# Patient Record
Sex: FEMALE | Race: WHITE | Employment: STUDENT | URBAN - NONMETROPOLITAN AREA
[De-identification: names, ages, dates, MRNs, and addresses within clinical notes are randomized per-mention and may not be internally consistent; named-entity substitution may affect disease eponyms.]

---
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
## 2020-11-05 ENCOUNTER — APPOINTMENT (OUTPATIENT)
Dept: GENERAL RADIOLOGY | Age: 20
End: 2020-11-05
Payer: COMMERCIAL

## 2020-11-05 ENCOUNTER — HOSPITAL ENCOUNTER (EMERGENCY)
Age: 20
Discharge: HOME | End: 2020-11-06
Attending: EMERGENCY MEDICINE
Payer: COMMERCIAL

## 2020-11-05 VITALS
TEMPERATURE: 97.4 F | DIASTOLIC BLOOD PRESSURE: 88 MMHG | BODY MASS INDEX: 32.28 KG/M2 | HEART RATE: 102 BPM | RESPIRATION RATE: 20 BRPM | WEIGHT: 200 LBS | OXYGEN SATURATION: 96 % | SYSTOLIC BLOOD PRESSURE: 134 MMHG

## 2020-11-05 PROCEDURE — 71045 X-RAY EXAM CHEST 1 VIEW: CPT

## 2020-11-05 PROCEDURE — 99283 EMERGENCY DEPT VISIT LOW MDM: CPT

## 2020-11-05 RX ORDER — ALBUTEROL SULFATE 90 UG/1
2 AEROSOL, METERED RESPIRATORY (INHALATION) EVERY 6 HOURS PRN
COMMUNITY

## 2020-11-05 RX ORDER — AZITHROMYCIN 250 MG/1
250 TABLET, FILM COATED ORAL DAILY
COMMUNITY
End: 2021-07-19 | Stop reason: ALTCHOICE

## 2020-11-05 ASSESSMENT — ENCOUNTER SYMPTOMS
SHORTNESS OF BREATH: 1
EYE DISCHARGE: 0
ABDOMINAL PAIN: 0
DIARRHEA: 0
SORE THROAT: 0
WHEEZING: 0
COUGH: 1
EYE PAIN: 0
BLOOD IN STOOL: 0

## 2020-11-06 PROCEDURE — 6370000000 HC RX 637 (ALT 250 FOR IP): Performed by: EMERGENCY MEDICINE

## 2020-11-06 RX ORDER — LEVOFLOXACIN 500 MG/1
500 TABLET, FILM COATED ORAL DAILY
Qty: 5 TABLET | Refills: 0 | Status: SHIPPED | OUTPATIENT
Start: 2020-11-06 | End: 2020-11-11

## 2020-11-06 RX ORDER — LEVOFLOXACIN 500 MG/1
500 TABLET, FILM COATED ORAL ONCE
Status: COMPLETED | OUTPATIENT
Start: 2020-11-06 | End: 2020-11-06

## 2020-11-06 RX ADMIN — LEVOFLOXACIN 500 MG: 500 TABLET, FILM COATED ORAL at 00:06

## 2020-11-06 NOTE — ED TRIAGE NOTES
Pt diagnosed with COVID. Complains of cough, shortness of breath and chest heaviness that has persisted.

## 2020-11-06 NOTE — ED NOTES
Pt given discharge instructions and prescription. Verbalized understanding and use of med. Pt left ambulatory per self. Pt in stable condition.       Kristi Gibbs RN  11/06/20 5847

## 2020-11-07 ENCOUNTER — CARE COORDINATION (OUTPATIENT)
Dept: CARE COORDINATION | Age: 20
End: 2020-11-07

## 2020-11-07 NOTE — CARE COORDINATION
Attempted to reach patient for covid-19 f/u s/p recent [facility] ED visit for c/o cough and SOB. COVID-19 testing was completed prior to visit and patient is noted to be positive 12 days prior. Patient was not available at the time of my call and generic voicemail message was left asking patient to please return call to my direct number. Will continue to work to f/u with patient in the future.

## 2020-11-09 ENCOUNTER — CARE COORDINATION (OUTPATIENT)
Dept: CARE COORDINATION | Age: 20
End: 2020-11-09

## 2021-09-17 ENCOUNTER — HOSPITAL ENCOUNTER (EMERGENCY)
Age: 21
Discharge: HOME | End: 2021-09-17
Payer: COMMERCIAL

## 2021-09-17 VITALS
RESPIRATION RATE: 20 BRPM | HEIGHT: 65 IN | HEART RATE: 107 BPM | SYSTOLIC BLOOD PRESSURE: 133 MMHG | TEMPERATURE: 97.5 F | WEIGHT: 200 LBS | OXYGEN SATURATION: 98 % | BODY MASS INDEX: 33.32 KG/M2 | DIASTOLIC BLOOD PRESSURE: 90 MMHG

## 2021-09-17 DIAGNOSIS — J45.901 ASTHMA WITH ACUTE EXACERBATION, UNSPECIFIED ASTHMA SEVERITY, UNSPECIFIED WHETHER PERSISTENT: ICD-10-CM

## 2021-09-17 DIAGNOSIS — J01.90 ACUTE RHINOSINUSITIS: Primary | ICD-10-CM

## 2021-09-17 PROCEDURE — 99213 OFFICE O/P EST LOW 20 MIN: CPT

## 2021-09-17 PROCEDURE — 99213 OFFICE O/P EST LOW 20 MIN: CPT | Performed by: NURSE PRACTITIONER

## 2021-09-17 RX ORDER — PREDNISONE 20 MG/1
40 TABLET ORAL DAILY
Qty: 10 TABLET | Refills: 0 | Status: SHIPPED | OUTPATIENT
Start: 2021-09-17 | End: 2021-09-22

## 2021-09-17 RX ORDER — AZITHROMYCIN 250 MG/1
TABLET, FILM COATED ORAL
Qty: 6 TABLET | Refills: 0 | Status: SHIPPED | OUTPATIENT
Start: 2021-09-17 | End: 2021-12-04

## 2021-09-17 ASSESSMENT — ENCOUNTER SYMPTOMS
SHORTNESS OF BREATH: 0
NAUSEA: 0
VOMITING: 0
SINUS PRESSURE: 1
COUGH: 1
WHEEZING: 0
DIARRHEA: 0
SORE THROAT: 0

## 2021-09-17 NOTE — ED TRIAGE NOTES
Pt to urgent care due to a cough and sinus congestion.  Pt gets tested at work and yesterday she was negative for COVID

## 2021-09-17 NOTE — ED NOTES
Discharge instructions and prescriptions reviewed with pt. Pt verbalized understanding. Pt ambulated out in stable condition. Assessment unchanged upon discharge.      Bautista Silveira RN  09/17/21 5937

## 2021-09-17 NOTE — ED PROVIDER NOTES
[address]  Urgent Care Encounter       CHIEF COMPLAINT       Chief Complaint   Patient presents with    Sinusitis    Cough       Nurses Notes reviewed and I agree except as noted in the HPI. HISTORY OF PRESENT ILLNESS   Juan Nichols is a 24 y.o. female who presents with complaints of cough, nasal and sinus congestion and tightness in the chest.  Symptoms have been present for the last 3 days. No reports of fever, chills, body aches nausea, vomiting or diarrhea. She was tested at her skilled nursing facility yesterday for Covid and was negative. Patient does report a history of asthma as well and place she is having mild flare. The history is provided by the patient. REVIEW OF SYSTEMS     Review of Systems   Constitutional: Negative for chills and fever. HENT: Positive for congestion, postnasal drip and sinus pressure. Negative for ear pain and sore throat (Resolved). Respiratory: Positive for cough. Negative for shortness of breath and wheezing. Cardiovascular: Negative for chest pain. Gastrointestinal: Negative for diarrhea, nausea and vomiting. Musculoskeletal: Negative for myalgias. Skin: Negative for rash. Neurological: Positive for headaches. PAST MEDICAL HISTORY         Diagnosis Date    ADHD (attention deficit hyperactivity disorder)     Asthma     Gallstone        SURGICALHISTORY     Patient  has a past surgical history that includes Tonsillectomy; Anterior cruciate ligament repair; Upper gastrointestinal endoscopy (N/A, 10/15/2018); Colonoscopy (N/A, 10/15/2018); [place] tooth extraction; pr office/outpt visit,procedure only (Left, 11/28/2018); and Cholecystectomy, laparoscopic (N/A, 1/3/2019).     CURRENT MEDICATIONS       Previous Medications    ALBUTEROL SULFATE HFA (VENTOLIN HFA) 108 (90 BASE) MCG/ACT INHALER    Inhale 2 puffs into the lungs every 6 hours as needed for Wheezing    AMPHETAMINE-DEXTROAMPHETAMINE (ADDERALL XR) 20 MG EXTENDED RELEASE CAPSULE    Take 1 capsule by mouth daily for 30 days. BUDESONIDE (PULMICORT FLEXHALER) 180 MCG/ACT AEPB INHALER    Inhale 2 puffs into the lungs 2 times daily    EPINEPHRINE (EPIPEN) 0.3 MG/0.3ML SOAJ INJECTION    Use as directed for allergic reaction       ALLERGIES     Patient is is allergic to bee venom and adhesive tape. Patients   There is no immunization history on file for this patient. FAMILY HISTORY     Patient's family history includes Cancer in her maternal grandmother; Heart Disease in her mother. SOCIAL HISTORY     Patient  reports that she has never smoked. She has never used smokeless tobacco. She reports that she does not drink alcohol and does not use drugs. PHYSICAL EXAM     ED TRIAGE VITALS  BP: (!) 133/90, Temp: 97.5 °F (36.4 °C), Pulse: 107, Resp: 20, SpO2: 98 %,Estimated body mass index is 33.28 kg/m² as calculated from the following:    Height as of this encounter: 5' 5\" (1.651 m). Weight as of this encounter: 200 lb (90.7 kg). ,No LMP recorded. Physical Exam  Vitals and nursing note reviewed. Constitutional:       General: She is not in acute distress. Appearance: She is well-developed. She is not ill-appearing. HENT:      Head: Normocephalic and atraumatic. Right Ear: Tympanic membrane and ear canal normal.      Left Ear: Tympanic membrane and ear canal normal.      Nose:      Right Sinus: Maxillary sinus tenderness (Mild) present. No frontal sinus tenderness. Left Sinus: Maxillary sinus tenderness (Mild) present. No frontal sinus tenderness. Mouth/Throat:      Lips: Pink. Mouth: Mucous membranes are moist.      Pharynx: Oropharynx is clear. No oropharyngeal exudate or posterior oropharyngeal erythema. Eyes:      General: Lids are normal. No scleral icterus. Conjunctiva/sclera:      Right eye: Right conjunctiva is not injected. Left eye: Left conjunctiva is not injected.       Pupils: Pupils are equal. Cardiovascular:      Rate and Rhythm: Normal rate and regular rhythm. Heart sounds: Normal heart sounds, S1 normal and S2 normal.   Pulmonary:      Effort: Pulmonary effort is normal. No respiratory distress. Breath sounds: Normal breath sounds. Musculoskeletal:      Comments: Normal active ROM x 4 extremities  Gait steady   Lymphadenopathy:      Comments: No head or neck adenopathy   Skin:     General: Skin is warm and dry. Findings: No rash (to exposed areas of skin). Nails: There is no clubbing. Neurological:      General: No focal deficit present. Mental Status: She is alert and oriented to person, place, and time. Sensory: No sensory deficit. Comments: Answers questions readily and appropriately   Psychiatric:         Mood and Affect: Mood normal.         Speech: Speech normal.         Behavior: Behavior normal. Behavior is cooperative. DIAGNOSTIC RESULTS     Labs:No results found for this visit on 09/17/21. IMAGING:    No orders to display         EKG:      URGENT CARE COURSE:     Vitals:    09/17/21 1604   BP: (!) 133/90   Pulse: 107   Resp: 20   Temp: 97.5 °F (36.4 °C)   TempSrc: Temporal   SpO2: 98%   Weight: 200 lb (90.7 kg)   Height: 5' 5\" (1.651 m)       Medications - No data to display         PROCEDURES:  None    FINAL IMPRESSION      1. Acute rhinosinusitis    2. Asthma with acute exacerbation, unspecified asthma severity, unspecified whether persistent          DISPOSITION/ PLAN     Patient presents with acute rhinosinusitis as well as a mild asthma exacerbation. Patient is not wheezing at this time. She does have albuterol inhalers at home that she can use as needed. Patient be treated with azithromycin and prednisone. Follow-up family doctor next week if not improving or ER for worsening condition. Further instructions were outlined verbally and in the patient's discharge instructions. All the patient's questions were answered.  The patient/parent agreed with the plan and was discharged from the  center in good condition. PATIENT REFERRED TO:  MD GRETA [address] [place] [id number] / [facility] [place] [id number]      DISCHARGE MEDICATIONS:  New Prescriptions    AZITHROMYCIN (ZITHROMAX) 250 MG TABLET    Take 2 tablets today, then take one tablet on days 2-5.     PREDNISONE (DELTASONE) 20 MG TABLET    Take 2 tablets by mouth daily for 5 days       Discontinued Medications    No medications on file       Current Discharge Medication List          Shahnaz Escalera, VICENTA - KISHA    (Please note that portions of this note were completed with a voice recognition program. Efforts were made to edit the dictations but occasionally words are mis-transcribed.)         VICENTA Espinal - KISHA  09/17/21 2127

## 2021-12-04 ENCOUNTER — HOSPITAL ENCOUNTER (EMERGENCY)
Age: 21
Discharge: HOME | End: 2021-12-04
Attending: FAMILY MEDICINE
Payer: COMMERCIAL

## 2021-12-04 VITALS
WEIGHT: 210 LBS | SYSTOLIC BLOOD PRESSURE: 128 MMHG | HEIGHT: 66 IN | HEART RATE: 118 BPM | BODY MASS INDEX: 33.75 KG/M2 | DIASTOLIC BLOOD PRESSURE: 86 MMHG | RESPIRATION RATE: 18 BRPM | TEMPERATURE: 97.6 F | OXYGEN SATURATION: 97 %

## 2021-12-04 DIAGNOSIS — J04.0 LARYNGITIS, ACUTE: Primary | ICD-10-CM

## 2021-12-04 PROCEDURE — 99283 EMERGENCY DEPT VISIT LOW MDM: CPT

## 2021-12-04 ASSESSMENT — ENCOUNTER SYMPTOMS
SORE THROAT: 1
TROUBLE SWALLOWING: 0
EYE REDNESS: 0
COUGH: 0
EYE DISCHARGE: 0
VOMITING: 0
NAUSEA: 0
SHORTNESS OF BREATH: 0

## 2021-12-05 NOTE — ED PROVIDER NOTES
[facility]  eMERGENCY dEPARTMENT eNCOUnter          CHIEF COMPLAINT       Chief Complaint   Patient presents with    Pharyngitis    Hoarse       Nurses Notes reviewed and I agree except as noted in the HPI. HISTORY OF PRESENT ILLNESS    Deja Biggs is a 24 y.o. female who presents with sore throat. Patient notes voice is \"hoarse\". Denies fever. Notes previously tested for Covid and rapid strep and both negative. REVIEW OF SYSTEMS     Review of Systems   Constitutional: Negative for chills and fever. HENT: Positive for ear pain and sore throat. Negative for trouble swallowing. Eyes: Negative for discharge and redness. Respiratory: Negative for cough and shortness of breath. Gastrointestinal: Negative for nausea and vomiting. All other systems reviewed and are negative. PAST MEDICAL HISTORY    has a past medical history of ADHD (attention deficit hyperactivity disorder), Asthma, and Gallstone. SURGICAL HISTORY      has a past surgical history that includes Tonsillectomy; Anterior cruciate ligament repair; Upper gastrointestinal endoscopy (N/A, 10/15/2018); Colonoscopy (N/A, 10/15/2018); [place] tooth extraction; pr office/outpt visit,procedure only (Left, 11/28/2018); and Cholecystectomy, laparoscopic (N/A, 1/3/2019). CURRENT MEDICATIONS       Discharge Medication List as of 12/4/2021 10:47 PM      CONTINUE these medications which have NOT CHANGED    Details   amphetamine-dextroamphetamine (ADDERALL XR) 20 MG extended release capsule Take 1 capsule by mouth every morning for 30 days. , Disp-30 capsule, R-0Normal      albuterol sulfate HFA (VENTOLIN HFA) 108 (90 Base) MCG/ACT inhaler Inhale 2 puffs into the lungs every 6 hours as needed for WheezingHistorical Med      EPINEPHrine (EPIPEN) 0.3 MG/0.3ML SOAJ injection Use as directed for allergic reaction, Disp-1 each, R-0Print      albuterol (PROVENTIL) (5 MG/ML) 0.5% nebulizer solution Take 0.5 mLs by nebulization 4 times daily as needed for Wheezing or Shortness of Breath, Disp-120 each, R-3Normal      budesonide (PULMICORT FLEXHALER) 180 MCG/ACT AEPB inhaler Inhale 2 puffs into the lungs 2 times daily, Disp-1 each, R-5Normal             ALLERGIES     is allergic to bee venom and adhesive tape. FAMILY HISTORY     She indicated that her mother is alive. She indicated that her father is alive. She indicated that the status of her maternal grandmother is unknown.   family history includes Cancer in her maternal grandmother; Heart Disease in her mother. SOCIAL HISTORY      reports that she has never smoked. She has never used smokeless tobacco. She reports that she does not drink alcohol and does not use drugs. PHYSICAL EXAM     INITIAL VITALS:  height is 5' 6\" (1.676 m) and weight is 210 lb (95.3 kg). Her temperature is 97.6 °F (36.4 °C). Her blood pressure is 128/86 and her pulse is 118. Her respiration is 18 and oxygen saturation is 97%. Physical Exam  Vitals and nursing note reviewed. Constitutional:       General: She is not in acute distress. Appearance: She is not ill-appearing. HENT:      Right Ear: Tympanic membrane normal.      Left Ear: Tympanic membrane normal.      Nose: No congestion or rhinorrhea. Mouth/Throat:      Mouth: Mucous membranes are dry. Pharynx: Oropharynx is clear. No oropharyngeal exudate or posterior oropharyngeal erythema. Tonsils: No tonsillar exudate or tonsillar abscesses. Eyes:      Conjunctiva/sclera: Conjunctivae normal.      Pupils: Pupils are equal, round, and reactive to light. Pulmonary:      Effort: Pulmonary effort is normal.      Breath sounds: Normal breath sounds. Musculoskeletal:      Cervical back: Normal range of motion and neck supple. Lymphadenopathy:      Cervical: No cervical adenopathy. Skin:     General: Skin is dry. Findings: No rash. Neurological:      Mental Status: She is alert.          DIFFERENTIAL DIAGNOSIS:   URI,laryngitis,    DIAGNOSTIC RESULTS     EKG: All EKG's are interpreted by the Emergency Department Physician who either signs or Co-signs this chart in the absence of a cardiologist.      RADIOLOGY: non-plain film images(s) such as CT, Ultrasound and MRI are read by the radiologist.      LABS:   Labs Reviewed - No data to display    EMERGENCY DEPARTMENT COURSE:   Vitals:    Vitals:    12/04/21 2200   BP: 128/86   Pulse: 118   Resp: 18   Temp: 97.6 °F (36.4 °C)   SpO2: 97%   Weight: 210 lb (95.3 kg)   Height: 5' 6\" (1.676 m)     On exam the patient does not have any focal findings she does have some laryngitis. But the posterior pharynx is otherwise clear. She states that she had a rapid strep that was negative. Her right TM is otherwise unremarkable. No cervical adenopathy. Lungs are otherwise clear this does look like a URI with some post laryngitis. I recommended some loss injures voice rest.  If symptoms persist more than a week then further follow-up with PCP may be warranted. PROCEDURES:  None    FINAL IMPRESSION      1. Laryngitis, acute          DISPOSITION/PLAN   Home. Care instructions provided. Follow up with PCP or ED as needed.      PATIENT REFERRED TO:  Bouchra Kaiser MD  [address] [place] [address]  [phone]    Call in 2 days  If symptoms worsen, As needed      DISCHARGE MEDICATIONS:  Discharge Medication List as of 12/4/2021 10:47 PM      START taking these medications    Details   Benzocaine-Menthol (CEPACOL EXTRA STRENGTH) 15-2.6 MG LOZG lozenge Take 1 lozenge by mouth 3 times daily as needed for Sore Throat, Disp-18 lozenge, R-0Normal             (Please note that portions of this note were completed with a voice recognition program.  Efforts were made to edit the dictations but occasionally words are mis-transcribed.)    MD Lani Barrientos MD  12/04/21 1524

## 2021-12-05 NOTE — ED NOTES
Discharge teaching and instructions for condition explained to patient. AVS reviewed. Went over prescriptions with patient. Patient voiced understanding regarding prescriptions, follow up appointments and care of self at home. Pt discharged to home in stable condition per self.        Michael Ibarra RN  12/04/21 6388

## 2021-12-05 NOTE — ED NOTES
Presents with brother. C/o sore hoarsness around thanksgiving after a lot of \"yelling and screaming\" at family fun. Pt then developed a sore throat on Tuesday with no other complaints. Swabbed Saturday by work at [facility] negative for covid and strep on Tuesday by PCP which was negative also. Pt states antibiotics offered but refused at that time by patient. Brother at bedside.    Job Prajapati RN  12/04/21 2207       Job Prajapati RN  12/04/21 2214

## 2022-01-07 PROBLEM — T63.441A ALLERGIC REACTION TO BEE STING: Status: ACTIVE | Noted: 2022-01-07

## 2022-01-07 PROBLEM — F98.8 ATTENTION DEFICIT DISORDER (ADD) WITHOUT HYPERACTIVITY: Status: ACTIVE | Noted: 2022-01-07

## 2022-01-19 PROBLEM — J45.909 ASTHMA: Status: ACTIVE | Noted: 2022-01-19

## 2022-03-28 ENCOUNTER — HOSPITAL ENCOUNTER (EMERGENCY)
Age: 22
Discharge: HOME | End: 2022-03-28
Payer: COMMERCIAL

## 2022-03-28 VITALS
SYSTOLIC BLOOD PRESSURE: 134 MMHG | BODY MASS INDEX: 34.99 KG/M2 | HEIGHT: 65 IN | TEMPERATURE: 97.8 F | DIASTOLIC BLOOD PRESSURE: 64 MMHG | HEART RATE: 105 BPM | OXYGEN SATURATION: 99 % | WEIGHT: 210 LBS | RESPIRATION RATE: 18 BRPM

## 2022-03-28 DIAGNOSIS — M54.32 SCIATICA OF LEFT SIDE: Primary | ICD-10-CM

## 2022-03-28 PROCEDURE — 99213 OFFICE O/P EST LOW 20 MIN: CPT

## 2022-03-28 PROCEDURE — 99213 OFFICE O/P EST LOW 20 MIN: CPT | Performed by: NURSE PRACTITIONER

## 2022-03-28 RX ORDER — CYCLOBENZAPRINE HCL 5 MG
5 TABLET ORAL 3 TIMES DAILY PRN
Qty: 12 TABLET | Refills: 0 | Status: SHIPPED | OUTPATIENT
Start: 2022-03-28 | End: 2022-04-01

## 2022-03-28 RX ORDER — IBUPROFEN 200 MG
800 TABLET ORAL EVERY 8 HOURS PRN
Qty: 120 TABLET | Refills: 3 | COMMUNITY
Start: 2022-03-28

## 2022-03-28 RX ORDER — PREDNISONE 20 MG/1
TABLET ORAL
Qty: 9 TABLET | Refills: 0 | Status: SHIPPED | OUTPATIENT
Start: 2022-03-28 | End: 2022-04-03

## 2022-03-28 ASSESSMENT — PAIN DESCRIPTION - FREQUENCY: FREQUENCY: CONTINUOUS

## 2022-03-28 ASSESSMENT — ENCOUNTER SYMPTOMS
NAUSEA: 0
BACK PAIN: 1

## 2022-03-28 ASSESSMENT — PAIN DESCRIPTION - ONSET: ONSET: GRADUAL

## 2022-03-28 ASSESSMENT — PAIN DESCRIPTION - PROGRESSION: CLINICAL_PROGRESSION: GRADUALLY WORSENING

## 2022-03-28 ASSESSMENT — PAIN DESCRIPTION - ORIENTATION: ORIENTATION: LEFT;LOWER

## 2022-03-28 ASSESSMENT — PAIN - FUNCTIONAL ASSESSMENT
PAIN_FUNCTIONAL_ASSESSMENT: PREVENTS OR INTERFERES SOME ACTIVE ACTIVITIES AND ADLS
PAIN_FUNCTIONAL_ASSESSMENT: 0-10

## 2022-03-28 ASSESSMENT — PAIN DESCRIPTION - DESCRIPTORS: DESCRIPTORS: DULL

## 2022-03-28 ASSESSMENT — PAIN SCALES - GENERAL: PAINLEVEL_OUTOF10: 4

## 2022-03-28 ASSESSMENT — PAIN DESCRIPTION - LOCATION: LOCATION: BACK;LEG

## 2022-03-28 ASSESSMENT — PAIN DESCRIPTION - PAIN TYPE: TYPE: ACUTE PAIN

## 2022-03-28 NOTE — ED PROVIDER NOTES
[address]  Urgent Care Encounter       CHIEF COMPLAINT       Chief Complaint   Patient presents with    Back Pain     left lower back radiating down left leg       Nurses Notes reviewed and I agree except as noted in the HPI. HISTORY OF PRESENT ILLNESS   Clara Archer is a 24 y.o. female who presents with complaints of left sided lower back pain that is radiating into her left buttock. This is a new problem that started a week ago. She had started exercising and may have strained her back. Denies known injury or trauma. C/O some intermittent tingling down into her toes. Has taken ibuprofen intermittently without improvement. The history is provided by the patient. REVIEW OF SYSTEMS     Review of Systems   Constitutional: Negative for activity change. Cardiovascular: Negative for leg swelling. Gastrointestinal: Negative for nausea. Musculoskeletal: Positive for back pain and myalgias. Negative for joint swelling. Neurological: Negative for weakness. PAST MEDICAL HISTORY         Diagnosis Date    ADHD (attention deficit hyperactivity disorder)     Asthma     Gallstone        SURGICALHISTORY     Patient  has a past surgical history that includes Tonsillectomy; Anterior cruciate ligament repair; Upper gastrointestinal endoscopy (N/A, 10/15/2018); Colonoscopy (N/A, 10/15/2018); [place] tooth extraction; pr office/outpt visit,procedure only (Left, 11/28/2018); and Cholecystectomy, laparoscopic (N/A, 1/3/2019).     CURRENT MEDICATIONS       Previous Medications    ALBUTEROL (PROVENTIL) (5 MG/ML) 0.5% NEBULIZER SOLUTION    Take 0.5 mLs by nebulization 4 times daily as needed for Wheezing or Shortness of Breath    ALBUTEROL SULFATE HFA (VENTOLIN HFA) 108 (90 BASE) MCG/ACT INHALER    Inhale 2 puffs into the lungs every 6 hours as needed for Wheezing    AMPHETAMINE-DEXTROAMPHETAMINE (ADDERALL XR) 20 MG EXTENDED RELEASE CAPSULE    Take 1 capsule by mouth every morning for 30 days. BUDESONIDE (PULMICORT FLEXHALER) 180 MCG/ACT AEPB INHALER    Inhale 2 puffs into the lungs 2 times daily    EPINEPHRINE (EPIPEN) 0.3 MG/0.3ML SOAJ INJECTION    Use as directed for allergic reaction       ALLERGIES     Patient is is allergic to bee venom and adhesive tape. Patients   Immunization History   Administered Date(s) Administered    COVID-19, Moderna, Primary or Immunocompromised, PF, 100mcg/0.5mL 11/15/2021, 01/03/2022    DTaP vaccine 2000, 01/29/2001, 04/05/2001, 05/06/2002, 12/30/2005    HPV 9-valent Phan Jian) 10/19/2016, 03/03/2017, 08/16/2017    Hepatitis A Ped/Adol (Havrix, Vaqta) 10/19/2016, 08/16/2017    Hepatitis B Ped/Adol (Engerix-B, Recombivax HB) 2000, 2000, 04/05/2001    Hib vaccine 2000, 01/29/2001, 04/05/2001, 05/06/2002    Influenza, MDCK Quadv, IM, PF (Flucelvax 2 yrs and older) 11/14/2021    MMR 09/13/2001, 12/30/2005    Meningococcal B, Recombinant Rob Murcia) 10/19/2016, 03/03/2017, 08/16/2017    Meningococcal MCV4P (Menactra) 08/28/2013, 07/13/2018    Pneumococcal Conjugate 7-valent (Tanya Screws) 2000, 01/29/2001, 04/05/2001, 09/13/2001    Polio Virus Vaccine 2000, 01/29/2001, 09/05/2001, 12/30/2005    Tdap (Boostrix, Adacel) 08/28/2013, 12/07/2015    Varicella (Varivax) 09/13/2001, 08/28/2013       FAMILY HISTORY     Patient's family history includes Cancer in her maternal grandmother; Heart Disease in her mother. SOCIAL HISTORY     Patient  reports that she has never smoked. She has never used smokeless tobacco. She reports that she does not drink alcohol and does not use drugs. PHYSICAL EXAM     ED TRIAGE VITALS  BP: 134/64, Temp: 97.8 °F (36.6 °C), Pulse: 105, Resp: 18, SpO2: 99 %,Estimated body mass index is 34.95 kg/m² as calculated from the following:    Height as of this encounter: 5' 5\" (1.651 m). Weight as of this encounter: 210 lb (95.3 kg). ,Patient's last menstrual period was 03/04/2022 daily for 3 days.        Discontinued Medications    BENZOCAINE-MENTHOL (CEPACOL EXTRA STRENGTH) 15-2.6 MG LOZG LOZENGE    Take 1 lozenge by mouth 3 times daily as needed for Sore Throat       Current Discharge Medication List          VICENTA Reddy - CNP    (Please note that portions of this note were completed with a voice recognition program. Efforts were made to edit the dictations but occasionally words are mis-transcribed.)            VICENTA Reddy CNP  03/28/22 1400

## 2022-03-28 NOTE — ED NOTES
PT GIVEN DISCHARGE INSTRUCTIONS, VERBALIZES UNDERSTANDING. PT ASSESSMENT UNCHANGED, DISCHARGED IN STABLE CONDITION.         Angela Martinez RN  03/28/22 2986

## 2022-12-12 ENCOUNTER — NURSE ONLY (OUTPATIENT)
Dept: LAB | Age: 22
End: 2022-12-12

## 2022-12-19 LAB — CYTOLOGY THIN PREP PAP: NORMAL

## 2023-12-13 ENCOUNTER — NURSE ONLY (OUTPATIENT)
Dept: LAB | Age: 23
End: 2023-12-13

## 2023-12-16 LAB
C TRACH RRNA SPEC QL NAA+PROBE: NEGATIVE
N GONORRHOEA RRNA SPEC QL NAA+PROBE: NEGATIVE
SPEC CONTAINER SPEC: NORMAL
SPECIMEN SOURCE: NORMAL
SPECIMEN SOURCE: NORMAL
T VAGINALIS RRNA SPEC QL NAA+PROBE: NEGATIVE

## 2024-03-07 ENCOUNTER — HOSPITAL ENCOUNTER (OUTPATIENT)
Dept: CT IMAGING | Age: 24
Discharge: HOME | End: 2024-03-07
Attending: FAMILY MEDICINE
Payer: COMMERCIAL

## 2024-03-07 DIAGNOSIS — J32.9 RECURRENT SINUSITIS: ICD-10-CM

## 2024-03-07 PROCEDURE — 70486 CT MAXILLOFACIAL W/O DYE: CPT

## 2025-02-26 ENCOUNTER — LAB (OUTPATIENT)
Dept: LAB | Age: 25
End: 2025-02-26

## 2025-02-26 DIAGNOSIS — R53.83 OTHER FATIGUE: ICD-10-CM

## 2025-02-26 LAB
BASOPHILS ABSOLUTE: 0 THOU/MM3 (ref 0–0.1)
BASOPHILS NFR BLD AUTO: 0.2 %
DEPRECATED RDW RBC AUTO: 38.7 FL (ref 35–45)
EOSINOPHIL NFR BLD AUTO: 1.1 %
EOSINOPHILS ABSOLUTE: 0.1 THOU/MM3 (ref 0–0.4)
ERYTHROCYTE [DISTWIDTH] IN BLOOD BY AUTOMATED COUNT: 13.8 % (ref 11.5–14.5)
HCG INTACT+B SERPL-ACNC: 2 MIU/ML (ref 0–5)
HCT VFR BLD AUTO: 40.7 % (ref 37–47)
HGB BLD-MCNC: 12.8 GM/DL (ref 12–16)
IMM GRANULOCYTES # BLD AUTO: 0.04 THOU/MM3 (ref 0–0.07)
IMM GRANULOCYTES NFR BLD AUTO: 0.4 %
LYMPHOCYTES ABSOLUTE: 1.8 THOU/MM3 (ref 1–4.8)
LYMPHOCYTES NFR BLD AUTO: 19.2 %
MCH RBC QN AUTO: 24.5 PG (ref 26–33)
MCHC RBC AUTO-ENTMCNC: 31.4 GM/DL (ref 32.2–35.5)
MCV RBC AUTO: 77.8 FL (ref 81–99)
MONOCYTES ABSOLUTE: 0.4 THOU/MM3 (ref 0.4–1.3)
MONOCYTES NFR BLD AUTO: 3.9 %
NEUTROPHILS ABSOLUTE: 7.1 THOU/MM3 (ref 1.8–7.7)
NEUTROPHILS NFR BLD AUTO: 75.2 %
NRBC BLD AUTO-RTO: 0 /100 WBC
PLATELET # BLD AUTO: 393 THOU/MM3 (ref 130–400)
PMV BLD AUTO: 10.3 FL (ref 9.4–12.4)
RBC # BLD AUTO: 5.23 MILL/MM3 (ref 4.2–5.4)
TSH SERPL DL<=0.05 MIU/L-ACNC: 0.92 UIU/ML (ref 0.27–4.2)
WBC # BLD AUTO: 9.5 THOU/MM3 (ref 4.8–10.8)

## 2025-02-28 ENCOUNTER — LAB (OUTPATIENT)
Dept: LAB | Age: 25
End: 2025-02-28

## 2025-02-28 LAB — HCG INTACT+B SERPL-ACNC: < 1 MIU/ML (ref 0–5)

## 2025-03-11 ENCOUNTER — LAB (OUTPATIENT)
Dept: LAB | Age: 25
End: 2025-03-11

## 2025-03-11 ENCOUNTER — HOSPITAL ENCOUNTER (OUTPATIENT)
Dept: ULTRASOUND IMAGING | Age: 25
Discharge: HOME | End: 2025-03-11
Attending: FAMILY MEDICINE
Payer: COMMERCIAL

## 2025-03-11 ENCOUNTER — RESULTS FOLLOW-UP (OUTPATIENT)
Dept: ULTRASOUND IMAGING | Age: 25
End: 2025-03-11

## 2025-03-11 DIAGNOSIS — R79.89 ELEVATED LIVER FUNCTION TESTS: ICD-10-CM

## 2025-03-11 DIAGNOSIS — Z13.6 SCREENING FOR ISCHEMIC HEART DISEASE: ICD-10-CM

## 2025-03-11 DIAGNOSIS — R79.89 ELEVATED LIVER FUNCTION TESTS: Primary | ICD-10-CM

## 2025-03-11 DIAGNOSIS — Z00.00 WELLNESS EXAMINATION: ICD-10-CM

## 2025-03-11 LAB
CHOLEST SERPL-MCNC: 129 MG/DL (ref 100–199)
HAV IGM SER QL: NONREACTIVE
HBV CORE IGM SERPL QL IA: NONREACTIVE
HBV SURFACE AG SERPL QL IA: NONREACTIVE
HCV IGG SERPL QL IA: NONREACTIVE
HDLC SERPL-MCNC: 42 MG/DL
LDLC SERPL CALC-MCNC: 74 MG/DL
TRIGL SERPL-MCNC: 63 MG/DL (ref 0–199)

## 2025-03-11 PROCEDURE — 76705 ECHO EXAM OF ABDOMEN: CPT

## 2025-07-03 ENCOUNTER — LAB (OUTPATIENT)
Dept: LAB | Age: 25
End: 2025-07-03

## 2025-07-03 LAB — HCG INTACT+B SERPL-ACNC: 59 MIU/ML (ref 0–5)

## 2025-07-07 ENCOUNTER — LAB (OUTPATIENT)
Dept: LAB | Age: 25
End: 2025-07-07

## 2025-07-07 LAB — HCG INTACT+B SERPL-ACNC: 306 MIU/ML (ref 0–5)

## 2025-08-11 ENCOUNTER — LAB (OUTPATIENT)
Dept: LAB | Age: 25
End: 2025-08-11

## 2025-08-13 LAB
C. TRACHOMATIS DNA,THIN PREP: NEGATIVE
N. GONORRHOEAE DNA, THIN PREP: NEGATIVE
SOURCE: NORMAL
SOURCE: NORMAL
TRICHOMONAS VAGINALIS, MOLECULAR: NEGATIVE

## 2025-08-23 ENCOUNTER — LAB (OUTPATIENT)
Dept: LAB | Age: 25
End: 2025-08-23

## 2025-08-28 LAB — CYTOLOGY THIN PREP PAP: NORMAL

## (undated) DEVICE — SKIN AFFIX SURG ADHESIVE 72/CS 0.55ML: Brand: MEDLINE

## (undated) DEVICE — DRESSING,GAUZE,XEROFORM,CURAD,5"X9",ST: Brand: CURAD

## (undated) DEVICE — TROCAR ENDOSCP L100MM DIA11MM STBL SL BLDELSS DISP ENDOPATH

## (undated) DEVICE — 3.0MM X 1000MM BALL TIP GUIDE ROD: Brand: TRIGEN

## (undated) DEVICE — WARMER SCP 2 ANTIFOG LAP DISP

## (undated) DEVICE — GUIDE PIN 3.2MM X 343MM: Brand: TRIGEN

## (undated) DEVICE — GLOVE SURG SZ 65 THK91MIL LTX FREE SYN POLYISOPRENE

## (undated) DEVICE — SPONGE GZ W4XL4IN COT 12 PLY TYP VII WVN C FLD DSGN

## (undated) DEVICE — CONMED SCOPE SAVER BITE BLOCK, 20X27 MM: Brand: SCOPE SAVER

## (undated) DEVICE — EXCEL 10FT (3.05 M) INSUFFLATION TUBING SET WITH 0.1 MICRON FILTER: Brand: EXCEL

## (undated) DEVICE — GLOVE ORANGE PI 8 1/2   MSG9085

## (undated) DEVICE — EVOS 2.7MM X 16MM LOCKING SCREW T8 SELF-TAPPING
Type: IMPLANTABLE DEVICE | Site: TIBIA | Status: NON-FUNCTIONAL
Brand: EVOS
Removed: 2018-11-28

## (undated) DEVICE — CHLORAPREP 26ML ORANGE

## (undated) DEVICE — YANKAUER,BULB TIP,W/O VENT,RIGID,STERILE: Brand: MEDLINE

## (undated) DEVICE — TUBING, SUCTION, 1/4" X 20', STRAIGHT: Brand: MEDLINE INDUSTRIES, INC.

## (undated) DEVICE — 2.0MM DRILL LONG WITH AO QUICK CONNECT: Brand: EVOS

## (undated) DEVICE — COVER ARMBRD W13XL28.5IN IMPERV BLU FOR OP RM

## (undated) DEVICE — TETRA-FLEX CF WOVEN LATEX FREE ELASTIC BANDAGE 6" X 5.5 YD: Brand: TETRA-FLEX™CF

## (undated) DEVICE — UNIVERSAL PLUS MULTIFUNCTION INSTRUMENT SYSTEM (STRAIGHT GRIP HANDLE), STRAIGHT HANDLE ELECTROSURGICAL SUCTION/IRRIGATION INSTRUMENT WITH HAND CONTROLLED ELECTROSURGERY (BUTTONS): Brand: UNIVERSAL PLUS

## (undated) DEVICE — BANDAGE COMPR E SGL LAYERED CLSR BGE W/ CLP W4INXL15FT

## (undated) DEVICE — UNIVERSAL PLUS LAPAROSCOPIC ELECTRODE WITH SUCTION/IRRIGATION, SPATULA 5 MM X 32 CM: Brand: UNIVERSAL PLUS

## (undated) DEVICE — DRAPE C ARM W36XL30IN RECTANG BND BG AND TAPE

## (undated) DEVICE — BANDAGE ADH W1XL3IN NAT FAB WVN FLX DURABLE N ADH PD SEAL

## (undated) DEVICE — SOLUTION IV 1000ML 0.45% SOD CHL PH 5 INJ USP VIAFLX PLAS

## (undated) DEVICE — CATHETER ETER IV 22GA L1IN POLYUR STR RADPQ INTROCAN SFTY

## (undated) DEVICE — KIT,ANTI FOG,W/SPONGE & FLUID,SOFT PACK: Brand: MEDLINE

## (undated) DEVICE — FORCEPS BX L240CM JAW DIA3.2MM L CAP W/ NDL MIC MESH TOOTH

## (undated) DEVICE — TUBING IV STOPCOCK 48 CM 3 W

## (undated) DEVICE — GLOVE SURG SZ 6 THK91MIL LTX FREE SYN POLYISOPRENE ANTI

## (undated) DEVICE — TROCAR ENDOSCP L100MM DIA5MM BLDELSS STBL SL THRD OPT VW

## (undated) DEVICE — GOWN,SIRUS,NONRNF,SETINSLV,XL,20/CS: Brand: MEDLINE

## (undated) DEVICE — LINER SUCT CANSTR 1500CC SEMI RIG W/ POR HYDROPHOBIC SHUT

## (undated) DEVICE — NEEDLE SUT 1/2 CIR TAPR TIP TNSL STRL SZ 1 DAVIS

## (undated) DEVICE — SOLUTION IV IRRIG WATER 1000ML POUR BRL 2F7114

## (undated) DEVICE — GLOVE ORANGE PI 7   MSG9070

## (undated) DEVICE — SET ADMIN 25ML L117IN PMP MOD CK VLV RLER CLMP 2 SMRTSITE

## (undated) DEVICE — 4.0MM SHORT PILOT DRILL WITH AO CONNECTOR: Brand: TRIGEN

## (undated) DEVICE — NEEDLE 1/2 CIR SZ 3 SURG FR SPRNG EYE

## (undated) DEVICE — UNIVERSAL MONOPOLAR LAPAROSCOPIC CABLE 10FT, 4MM PIN CONNECTOR: Brand: CONMED

## (undated) DEVICE — SET IRRIG L94IN ID0.281IN W/ 4.5IN DST FLX CONN 2 LD ON OFF

## (undated) DEVICE — BANDAGE,GAUZE,4.5"X4.1YD,STERILE,LF: Brand: MEDLINE

## (undated) DEVICE — GENERAL LAPAROSCOPY PACK-LF: Brand: MEDLINE INDUSTRIES, INC.

## (undated) DEVICE — APPLIER CLP M L L11.4IN DIA10MM ENDOSCP ROT MULT FOR LIG

## (undated) DEVICE — 4-PORT MANIFOLD: Brand: NEPTUNE 2

## (undated) DEVICE — ENDO KIT: Brand: MEDLINE INDUSTRIES, INC.

## (undated) DEVICE — CONTAINER,SPECIMEN,PNEU TUBE,4OZ,OR STRL: Brand: MEDLINE

## (undated) DEVICE — BAG SPEC REM 224ML W4XL6IN DIA10MM 1 HND GYN DISP ENDOPCH

## (undated) DEVICE — C-ARM: Brand: UNBRANDED

## (undated) DEVICE — SPONGE LAP W18XL18IN WHT COT 4 PLY FLD STRUNG RADPQ DISP ST

## (undated) DEVICE — IV START KIT: Brand: MEDLINE INDUSTRIES, INC.

## (undated) DEVICE — CONNECTOR TBNG AUX H2O JET DISP FOR OLY 160/180 SER